# Patient Record
Sex: MALE | URBAN - METROPOLITAN AREA
[De-identification: names, ages, dates, MRNs, and addresses within clinical notes are randomized per-mention and may not be internally consistent; named-entity substitution may affect disease eponyms.]

---

## 2021-05-18 ENCOUNTER — OFFICE VISIT (OUTPATIENT)
Dept: URBAN - METROPOLITAN AREA CLINIC 37 | Facility: CLINIC | Age: 46
End: 2021-05-18

## 2021-05-18 VITALS
WEIGHT: 245 LBS | TEMPERATURE: 98.6 F | HEART RATE: 75 BPM | BODY MASS INDEX: 40.82 KG/M2 | SYSTOLIC BLOOD PRESSURE: 128 MMHG | DIASTOLIC BLOOD PRESSURE: 84 MMHG | HEIGHT: 65 IN

## 2021-05-18 RX ORDER — DOCUSATE SODIUM 100 MG/1
2 CAPSULES CAPSULE ORAL ONCE A DAY
Qty: 60 CAPSULE | Refills: 2 | OUTPATIENT
Start: 2021-05-18

## 2021-05-18 RX ORDER — HYDROCHLOROTHIAZIDE 12.5 MG/1
1 CAPSULE IN THE MORNING CAPSULE ORAL ONCE A DAY
Qty: 30 | Status: ACTIVE | COMMUNITY
Start: 2021-05-01

## 2021-05-18 RX ORDER — POLYETHYLENE GLYCOL 3350 17 G/17G
17 GM MIXED IN 10 OZ OF FLUID POWDER, FOR SOLUTION ORAL BID
Qty: 1 BOTTLE | Refills: 2 | OUTPATIENT
Start: 2021-05-18

## 2021-05-18 NOTE — HPI-MIGRATED HPI
Initial consultation : Patient is here for -> Constipation Onset of symptoms: more than a year Patient was seen by PCP, Dr. Malik Platt on 05/01/2021 Patient was advised to try MiraLax, 17 gm daily for contipation and increase a high fiber diet . He only takees as needed Current BM: one BM daily but very dry and hard and very small stools come out  He admits few times he saw BRB drop on wiping and on toilet bowl Family history of GI malignancy: denies Tests/evaluations done previously: Denies prior Colonosopy;   Interim investigations : Labs done on: ->  * 04/24/2021, ordered by PCP: ;

## 2021-06-18 ENCOUNTER — OFFICE VISIT (OUTPATIENT)
Dept: URBAN - METROPOLITAN AREA CLINIC 37 | Facility: CLINIC | Age: 46
End: 2021-06-18

## 2021-06-18 VITALS — BODY MASS INDEX: 39.99 KG/M2 | HEIGHT: 65 IN | WEIGHT: 240 LBS

## 2021-06-18 PROBLEM — 305058001: Status: ACTIVE | Noted: 2021-06-18

## 2021-06-18 PROBLEM — 14760008: Status: ACTIVE | Noted: 2021-05-18

## 2021-06-18 PROBLEM — 77880009: Status: ACTIVE | Noted: 2021-06-18

## 2021-06-18 RX ORDER — DOCUSATE SODIUM 100 MG/1
2 CAPSULES CAPSULE ORAL ONCE A DAY
Qty: 60 CAPSULE | Refills: 2 | COMMUNITY
Start: 2021-05-18

## 2021-06-18 RX ORDER — HYDROCHLOROTHIAZIDE 12.5 MG/1
1 CAPSULE IN THE MORNING CAPSULE ORAL ONCE A DAY
Qty: 30 | COMMUNITY
Start: 2021-05-01

## 2021-06-18 RX ORDER — POLYETHYLENE GLYCOL 3350 17 G/17G
17 GM MIXED IN 10 OZ OF FLUID POWDER, FOR SOLUTION ORAL BID
Qty: 1 BOTTLE | Refills: 2 | COMMUNITY
Start: 2021-05-18

## 2021-06-18 RX ORDER — POLYETHYLENE GLYCOL 3350 17 G/17G
17 GM MIXED IN 10 OZ OF FLUID POWDER, FOR SOLUTION ORAL BID
Qty: 1 BOTTLE | Refills: 2 | OUTPATIENT

## 2021-06-18 RX ORDER — HYDROCORTISONE CREAM 1% 10 MG/G
1 APPLICATION CREAM TOPICAL ONCE A DAY
Qty: 1 TUBE | Refills: 2 | OUTPATIENT
Start: 2021-06-18

## 2021-06-18 RX ORDER — DOCUSATE SODIUM 100 MG/1
2 CAPSULES CAPSULE ORAL ONCE A DAY
Qty: 60 CAPSULE | Refills: 2 | OUTPATIENT

## 2021-06-18 NOTE — HPI-MIGRATED HPI
Follow up OV : Current symptoms are -> Denies rectal bleeding but admits rectal discomfort after wiping  Current BM: one soft BM every 2-3 days ;   Follow up OV : Patient is here for a routine OV for -> constipation ;   Follow up OV : Last OV was -> 1 month ago;   Follow up OV : Patient is on -> Colace 100mg QD + Miralax 17gm BID  Patient was also given sample of Linzess in last visit;

## 2021-08-13 ENCOUNTER — DASHBOARD ENCOUNTERS (OUTPATIENT)
Age: 46
End: 2021-08-13

## 2021-08-18 ENCOUNTER — OFFICE VISIT (OUTPATIENT)
Dept: URBAN - METROPOLITAN AREA CLINIC 37 | Facility: CLINIC | Age: 46
End: 2021-08-18